# Patient Record
Sex: FEMALE | URBAN - METROPOLITAN AREA
[De-identification: names, ages, dates, MRNs, and addresses within clinical notes are randomized per-mention and may not be internally consistent; named-entity substitution may affect disease eponyms.]

---

## 2020-09-24 ENCOUNTER — NEW PATIENT (OUTPATIENT)
Dept: URBAN - METROPOLITAN AREA CLINIC 43 | Facility: CLINIC | Age: 60
End: 2020-09-24
Payer: COMMERCIAL

## 2020-09-24 DIAGNOSIS — H11.011 AMYLOID PTERYGIUM OF RIGHT EYE: ICD-10-CM

## 2020-09-24 DIAGNOSIS — H52.4 PRESBYOPIA: ICD-10-CM

## 2020-09-24 DIAGNOSIS — H25.13 AGE-RELATED NUCLEAR CATARACT, BILATERAL: Primary | ICD-10-CM

## 2020-09-24 PROCEDURE — 92004 COMPRE OPH EXAM NEW PT 1/>: CPT | Performed by: OPTOMETRIST

## 2020-09-24 ASSESSMENT — INTRAOCULAR PRESSURE
OS: 14
OD: 16

## 2020-09-24 ASSESSMENT — VISUAL ACUITY
OD: 20/20
OS: 20/20

## 2020-09-24 ASSESSMENT — KERATOMETRY
OD: 43.75
OS: 43.75

## 2022-05-19 ENCOUNTER — OFFICE VISIT (OUTPATIENT)
Dept: URBAN - METROPOLITAN AREA CLINIC 44 | Facility: CLINIC | Age: 62
End: 2022-05-19
Payer: COMMERCIAL

## 2022-05-19 DIAGNOSIS — H04.123 TEAR FILM INSUFFICIENCY OF BILATERAL LACRIMAL GLANDS: Primary | ICD-10-CM

## 2022-05-19 DIAGNOSIS — H52.4 PRESBYOPIA: ICD-10-CM

## 2022-05-19 DIAGNOSIS — H25.13 AGE-RELATED NUCLEAR CATARACT, BILATERAL: ICD-10-CM

## 2022-05-19 DIAGNOSIS — H11.041 PERIPHERAL PTERYGIUM, STATIONARY, RIGHT EYE: ICD-10-CM

## 2022-05-19 PROCEDURE — 92134 CPTRZ OPH DX IMG PST SGM RTA: CPT | Performed by: OPTOMETRIST

## 2022-05-19 PROCEDURE — 99214 OFFICE O/P EST MOD 30 MIN: CPT | Performed by: OPTOMETRIST

## 2022-05-19 ASSESSMENT — KERATOMETRY
OS: 43.88
OD: 44.13

## 2022-05-19 ASSESSMENT — INTRAOCULAR PRESSURE
OD: 11
OS: 11

## 2022-05-19 ASSESSMENT — VISUAL ACUITY
OS: 20/25
OD: 20/20

## 2022-05-19 NOTE — IMPRESSION/PLAN
Impression: Peripheral pterygium, stationary, right eye: H11.041. Plan: Double pterygium OD. Not bothersome to patient. Use AT's for comfort. Wear UV blocking sunglasses when outdoors. Monitor.

## 2023-11-06 ENCOUNTER — OFFICE VISIT (OUTPATIENT)
Dept: URBAN - METROPOLITAN AREA CLINIC 43 | Facility: CLINIC | Age: 63
End: 2023-11-06
Payer: COMMERCIAL

## 2023-11-06 DIAGNOSIS — H25.813 COMBINED FORMS OF AGE-RELATED CATARACT, BILATERAL: Primary | ICD-10-CM

## 2023-11-28 ENCOUNTER — OFFICE VISIT (OUTPATIENT)
Dept: URBAN - METROPOLITAN AREA CLINIC 43 | Facility: CLINIC | Age: 63
End: 2023-11-28
Payer: COMMERCIAL

## 2023-11-28 DIAGNOSIS — H43.813 VITREOUS DEGENERATION, BILATERAL: ICD-10-CM

## 2023-11-28 DIAGNOSIS — H25.813 COMBINED FORMS OF AGE-RELATED CATARACT, BILATERAL: Primary | ICD-10-CM

## 2023-11-28 DIAGNOSIS — H11.041 PERIPHERAL PTERYGIUM, STATIONARY, RIGHT EYE: ICD-10-CM

## 2023-11-28 PROCEDURE — 92136 OPHTHALMIC BIOMETRY: CPT | Performed by: OPHTHALMOLOGY

## 2023-11-28 PROCEDURE — 99204 OFFICE O/P NEW MOD 45 MIN: CPT | Performed by: OPHTHALMOLOGY

## 2024-01-17 ENCOUNTER — POST-OPERATIVE VISIT (OUTPATIENT)
Dept: URBAN - METROPOLITAN AREA CLINIC 33 | Facility: CLINIC | Age: 64
End: 2024-01-17
Payer: COMMERCIAL

## 2024-01-17 PROCEDURE — 99024 POSTOP FOLLOW-UP VISIT: CPT

## 2024-01-17 ASSESSMENT — INTRAOCULAR PRESSURE
OD: 15
OS: 11

## 2024-01-24 ENCOUNTER — POST-OPERATIVE VISIT (OUTPATIENT)
Dept: URBAN - METROPOLITAN AREA CLINIC 56 | Facility: LOCATION | Age: 64
End: 2024-01-24

## 2024-01-24 DIAGNOSIS — H52.4 PRESBYOPIA: Primary | ICD-10-CM

## 2024-01-24 DIAGNOSIS — Z48.810 ENCOUNTER FOR SURGICAL AFTERCARE FOLLOWING SURGERY ON A SENSE ORGAN: ICD-10-CM

## 2024-01-24 PROCEDURE — 99024 POSTOP FOLLOW-UP VISIT: CPT

## 2024-01-24 PROCEDURE — 92015 DETERMINE REFRACTIVE STATE: CPT

## 2024-01-24 ASSESSMENT — VISUAL ACUITY
OD: 20/20
OS: 20/50

## 2024-01-24 ASSESSMENT — INTRAOCULAR PRESSURE
OD: 16
OS: 16

## 2024-09-11 ENCOUNTER — OFFICE VISIT (OUTPATIENT)
Dept: URBAN - METROPOLITAN AREA CLINIC 43 | Facility: CLINIC | Age: 64
End: 2024-09-11
Payer: COMMERCIAL

## 2024-09-11 DIAGNOSIS — H04.123 TEAR FILM INSUFFICIENCY OF BILATERAL LACRIMAL GLANDS: ICD-10-CM

## 2024-09-11 DIAGNOSIS — H11.041 PERIPHERAL PTERYGIUM, STATIONARY, RIGHT EYE: ICD-10-CM

## 2024-09-11 DIAGNOSIS — H26.491 OTHER SECONDARY CATARACT, RIGHT EYE: Primary | ICD-10-CM

## 2024-09-11 PROCEDURE — 99214 OFFICE O/P EST MOD 30 MIN: CPT | Performed by: OPTOMETRIST

## 2024-09-11 ASSESSMENT — KERATOMETRY
OS: 44.50
OD: 44.75

## 2024-09-11 ASSESSMENT — INTRAOCULAR PRESSURE
OS: 17
OD: 17

## 2024-09-11 ASSESSMENT — VISUAL ACUITY
OD: 20/20
OS: 20/20